# Patient Record
Sex: FEMALE | Race: WHITE | NOT HISPANIC OR LATINO | Employment: UNEMPLOYED | URBAN - METROPOLITAN AREA
[De-identification: names, ages, dates, MRNs, and addresses within clinical notes are randomized per-mention and may not be internally consistent; named-entity substitution may affect disease eponyms.]

---

## 2017-04-13 PROBLEM — J30.1 SEASONAL ALLERGIC RHINITIS DUE TO POLLEN: Status: ACTIVE | Noted: 2017-04-13

## 2017-06-22 PROBLEM — J30.9 ALLERGIC RHINITIS: Status: ACTIVE | Noted: 2017-06-22

## 2018-09-04 PROBLEM — M25.532 LEFT WRIST PAIN: Status: ACTIVE | Noted: 2018-09-04

## 2018-09-04 PROBLEM — M77.12 LEFT LATERAL EPICONDYLITIS: Status: ACTIVE | Noted: 2018-09-04

## 2018-09-04 PROBLEM — M25.522 LEFT ELBOW PAIN: Status: ACTIVE | Noted: 2018-09-04

## 2019-01-18 PROBLEM — E03.9 ACQUIRED HYPOTHYROIDISM: Status: ACTIVE | Noted: 2019-01-18

## 2019-01-18 PROBLEM — E78.2 MIXED HYPERLIPIDEMIA: Status: ACTIVE | Noted: 2019-01-18

## 2021-05-06 ENCOUNTER — PATIENT MESSAGE (OUTPATIENT)
Dept: RESEARCH | Facility: HOSPITAL | Age: 41
End: 2021-05-06

## 2022-11-28 PROBLEM — F41.9 ANXIETY AND DEPRESSION: Status: ACTIVE | Noted: 2022-11-28

## 2022-11-28 PROBLEM — F32.A ANXIETY AND DEPRESSION: Status: ACTIVE | Noted: 2022-11-28

## 2023-06-06 PROBLEM — M75.42 ROTATOR CUFF IMPINGEMENT SYNDROME OF LEFT SHOULDER: Status: ACTIVE | Noted: 2023-06-06

## 2023-12-20 ENCOUNTER — OFFICE VISIT (OUTPATIENT)
Dept: NEUROSURGERY | Facility: CLINIC | Age: 43
End: 2023-12-20
Payer: OTHER GOVERNMENT

## 2023-12-20 VITALS
HEIGHT: 61 IN | WEIGHT: 154.13 LBS | DIASTOLIC BLOOD PRESSURE: 87 MMHG | BODY MASS INDEX: 29.1 KG/M2 | RESPIRATION RATE: 18 BRPM | SYSTOLIC BLOOD PRESSURE: 125 MMHG | HEART RATE: 77 BPM

## 2023-12-20 DIAGNOSIS — R42 VERTIGO: ICD-10-CM

## 2023-12-20 DIAGNOSIS — M50.10 CERVICAL DISC DISORDER WITH RADICULOPATHY: Primary | ICD-10-CM

## 2023-12-20 DIAGNOSIS — M50.30 DDD (DEGENERATIVE DISC DISEASE), CERVICAL: ICD-10-CM

## 2023-12-20 PROCEDURE — 99204 PR OFFICE/OUTPT VISIT, NEW, LEVL IV, 45-59 MIN: ICD-10-PCS | Mod: S$PBB,,, | Performed by: STUDENT IN AN ORGANIZED HEALTH CARE EDUCATION/TRAINING PROGRAM

## 2023-12-20 PROCEDURE — 99204 OFFICE O/P NEW MOD 45 MIN: CPT | Mod: S$PBB,,, | Performed by: STUDENT IN AN ORGANIZED HEALTH CARE EDUCATION/TRAINING PROGRAM

## 2023-12-20 RX ORDER — MULTIVITAMIN
1 TABLET ORAL DAILY
COMMUNITY

## 2023-12-20 NOTE — PROGRESS NOTES
[facility] - [facility]  Clinic Consult     Consult Requested By: Susie Anthony MD, John Burt II, *        SUBJECTIVE:     Chief Complaint:   Chief Complaint   Patient presents with    Cervical Spine Pain (C-spine)     She experiences weakness and pain in the left shoulder radiating to fingers on the left hand.       History of Present Illness:  Veronica Funes is a 43 y.o. female who presents with   History of shoulder pain.  Responded to injection significant improvement.  However she has had flare-ups of left arm pain down to the wrist.  Not really the hand    She feels that she is 2 components did improve with the shoulder has shoulder pathology.  To have a flare-up of left-sided radiculopathy    A cervical MRI was obtained.  Which shows moderate multilevel degeneration, without significant stenosis at C3-4 and 4 5, at C5-6 disc osteophyte complex on the left with mass effect along the lateral recess, this is also present at C6-7 there is a slightly lesser degree with foraminal stenosis    Currently she is asymptomatic.  Her symptoms have resolved we reviewed the imaging      Vertigo which she has had episodes in the past in his now flare-up, this can be initiated with slight motion so she is hesitant              Pertinent and Recent history, provider evaluations, imaging and data reviewed in [facility]               Diagnostic Results:  I have independently reviewed the following imaging:  Impression:     1. Cervical degenerative changes from C3-C4 to C6-C7 resulting in mild-to-moderate spinal canal stenosis at C5-C6 and C6-C7 as detailed above.  2. Prominent bilateral cervical lymph nodes measuring up to 1.1 cm.        Electronically signed by: Terry Sheth  Date:                                            11/21/2023  Time:                                           16:14        Review of patient's allergies indicates:   Allergen Reactions    Ceclor [cefaclor]  Anaphylaxis    Prevacid [lansoprazole] Swelling       Past Medical History:   Diagnosis Date    Acquired hypothyroidism 2019    Crohn disease     Fatty liver     Left lateral epicondylitis 2018    Nonspecific colitis      Past Surgical History:   Procedure Laterality Date    abdominal benign tumor      ABLATION      ADENOIDECTOMY      APPENDECTOMY       SECTION      x 3    COLONOSCOPY      gastric sleeve  10/04/2020    sleeve     tonsilectomy      tummy tuck  2023     Family History   Problem Relation Age of Onset    Cancer Father         prostate    Diabetes Father     Hypertension Father     Diabetes Mother     Breast cancer Maternal Grandmother     Ovarian cancer Neg Hx      Social History     Tobacco Use    Smoking status: Never    Smokeless tobacco: Never   Substance Use Topics    Alcohol use: Yes     Comment: occassionally    Drug use: No        Review of Systems:    Constitutional: no fever, chills or night sweats. No abrupt changes in weight   Eyes: no diplopia, lid drooping, loss of vision   ENT: no nasal congestion, sore throat, discharge  Respiratory: no cough, shortness of breath, or pain  Cardiovascular: no chest pain or palpitations   G      OBJECTIVE:     Vital Signs (Most Recent):  Pulse: 77 (23 1035)  Resp: 18 (23 1035)  BP: 125/87 (23 1035)    Physical Exam:    General: well developed, well nourished, no distress. .  Mental Status: Awake, Alert, Oriented x 4  Language: No aphasia  Speech: No dysarthria  Head: normocephalic, atraumatic.  Neck: trachea midline, no JVD   Cardiovascular: no LE edema  Pulmonary: normal respirations, no signs of respiratory distress  Abdomen: soft, non-distended    Motor Strength:  No abnormal movements seen.     Strength  Deltoids Triceps Biceps Wrist Extension Wrist Flexion Hand  Interossei     Upper: R 5/5 5/5 5/5 5/5 5/5 5/5 5/5      L 5/5 5/5 5/5 5/5 5/5 5/5 5/5       Iliopsoas Quadriceps Knee  Flexion Tibialis  anterior  Gastro- cnemius EHL  Foot Eversion Foot inversion   Lower: R 5/5 5/5 5/5 5/5 5/5 5/5 5/5 5/5 5/5    L 5/5 5/5 5/5 5/5 5/5 5/5 5/5 5/5 5/5     SILT,PP      DTR's: 2 + and symmetric in UE and LE  Sweeney: absent    Gait: normal      Currently limited cervical range of motion.  She feels that motion is exacerbating her vertigo               ASSESSMENT/PLAN:     Cervical disc disorder with radiculopathy    DDD (degenerative disc disease), cervical  -     Ambulatory referral/consult to Neurosurgery    Vertigo  Comments:  unrelated to cervical degeneration  ENT eval    43-year-old female.  With shoulder pathology.  Has worked with Dr. Burt with improvement  Secondarily.  She has had an episode of cervical radiculopathy in her left arm, most likely symptomatic from C5-6 and C6-7  Her symptoms have recovered.  She is currently asymptomatic  She has had a history also with limited neck range of motion  At this point we will refer her to physical therapy for evaluation and treatment  Should she have any recurrence of radicular symptoms.  She will return.  She will continue to manage her shoulder with Dr. ASCENCIO    Signs and symptoms needing re-evaluation were reviewed in detail  Should she have recurrence of pain.  We discussed pain management/KIRAN referral  She will keep us informed              Horace Cuellar MD  Neurosurgery